# Patient Record
(demographics unavailable — no encounter records)

---

## 2025-01-07 NOTE — ASSESSMENT
[FreeTextEntry1] : 26 year old female with myofascial complaints PT meloxicam  We will also prescribe Muscle Relaxants as needed.  Discussed side effects including but not limited to drowsiness and dizziness.  Discussed patient should not drive after taking the medicine. MRI L spine

## 2025-01-07 NOTE — HISTORY OF PRESENT ILLNESS
[de-identified] : 1/7/25 26 year old female presents with complaints of low back pain that has started one year ago. 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 Patient was evaluated at Urgent care with recommendation to begin course of naproxen and flexeril prn. Denies radicular pain, n/t.  No PmHx, NKDA Occupation: Transport at [employer]  Date of Injury/Onset: 1 year  Pain: At Rest: 7-8/10 With Activity:  8-9/10 Mechanism of injury: NKI  Quality of symptoms: Stiffness, weakness, sharp pains occasionally  Improves with: back brace, Naproxen, heating pad  Worse with: Sleeping, bending, sitting, getting in the car  Prior treatment: [facility] Prior Imaging: None  Additional Information: None

## 2025-05-01 NOTE — HISTORY OF PRESENT ILLNESS
[FreeTextEntry1] : Ms. Celestin is a 26-year-old woman with a possible history of a learning disability referred for neuropsychological evaluation due to longstanding difficulties with attention and executive functioning.    She reported specific difficulties in the following areas: -	Attention: Difficulties with maintaining focus, distractibility, and returning to/completing tasks after getting distracted. -	Executive Functioning: Difficulties with initiation, maintaining organization, following through with plans and procrastination. -	Language: Challenges with expressing thoughts accurately and requiring repetition if she is inattentive. -	Memory: Difficulty with remembering new information and feels like it requires effort to recall past events.  Developmental History: Born via emergency . Milestones reported to have been met within the expected timeframe.  Medical History: Asthma, Polycystic Ovarian Syndrome  Psychiatric History / Mood: No formal diagnosis; She described feeling overwhelmed, stressed and under pressure her entire life.    Substance Use: Consumes alcohol rarely, socially  Medications: Birth control  Family History: Diabetes: Grandparents  Educational / Work History: Ms. Celestin had an IEP from  and throughout school, and was an overall C+ student. 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  Psychosocial History: Ms. Celestin is of [place] descent and lives in [place] with her father and step-mother. She stays with family members in [place] when she commutes for classes and work.

## 2025-05-01 NOTE — PHYSICAL EXAM
[Average] : average [Cooperative] : cooperative [Full] : full [Clear] : clear [Linear/Goal Directed] : linear/goal directed [WNL] : within normal limits [FreeTextEntry8] : Overwhelmed

## 2025-05-01 NOTE — REASON FOR VISIT
[Patient preference] : as per patient preference [Telehealth (audio & video) - Individual/Group] : This visit was provided via telehealth using real-time 2-way audio visual technology. [Medical Office: ([facility])___] : The provider was located at the medical office in [unfilled]. [Home] : The patient, [unfilled], was located at home, [unfilled], at the time of the visit. [Participant(s) identity verified] : Participant(s) identity verified. [For new patient(s) – practitioner identifies themselves to participants] : Practitioner identifies themselves to participants. [Verbal consent obtained from patient/other participant(s)] : Verbal consent for telehealth/telephonic services obtained from patient/other participant(s) [Consultation for neuropsychological evaluation] : Consultation for neuropsychological evaluation [Patient] : Patient

## 2025-05-01 NOTE — DISCUSSION/SUMMARY
[FreeTextEntry8] : Ms. Celestin presents for consultation with cognitive and behavioral, and academic concerns that are impacting her daily life and raise concern for the presence of ADHD.  .  [FreeTextEntry4] : .  It is recommended that she undergo a full neuropsychological evaluation to determine nature of the stated concerns and to aid in differential diagnosis and treatment planning.

## 2025-06-05 NOTE — REASON FOR VISIT
[Patient preference] : as per patient preference [Telehealth (audio & video) - Individual/Group] : This visit was provided via telehealth using real-time 2-way audio visual technology. [Medical Office: ([facility])___] : The provider was located at the medical office in [unfilled]. [Home] : The patient, [unfilled], was located at home, [unfilled], at the time of the visit. [Participant(s) identity verified] : Participant(s) identity verified. [Verbal consent obtained from patient/other participant(s)] : Verbal consent for telehealth/telephonic services obtained from patient/other participant(s) [Feedback of results of neuropsychological evaluation] : Feedback of results of neuropsychological evaluation [Patient] : Patient [FreeTextEntry1] : Neuropsychological Evaluation Report Personal Information	Evaluation Information Name: Antonette Celestin	Date of Interview: 2025 YOB: 1998	Date of Evaluation: 2028 Racial/Ethnic Identity: [place]American	Referring Provider: Self-Referred Primary Language: English 	Provider[facility] Peyman Hopkins, PhD, [facility] Education: 14 years	Examiner: Albertina Leong M.S.  The following information was obtained during an interview with Ms. Celestin, a review of medical records, and a psychometric evaluation (see appendix). ________________________________________  Referral & Background Ms. Celestin is a 26-year-old, right-handed woman referred for neuropsychological evaluation to characterize her cognitive functioning in the context of longstanding difficulties with attention and executive functioning.  Ms. Celestin first observed cognitive challenges in her childhood. She described experiencing difficulties with attention, specifically maintaining focus, distractibility, and returning to/completing tasks after getting distracted. She also shared executive functioning challenges, including difficulties with initiation, maintaining organization, following through with plans, and procrastination. Her attention and executive issues also impact her memory in that she experiences difficulty remembering new information and needing increased effort to recall past events.   With onset in childhood, these difficulties have impacted her schooling, work, and personal life.   Previous Evaluations: Ms. Celestin was evaluated during high school (2017) for language challenges. -	Clinical Evaluation of Language Fundamentals- 5 (CELF-5): o	Understanding Spoken Paragraphs: SS= 75 o	Semantic Relationships: SS= 80 o	Recalling Sentences: SS= 85  Developmental History:  -	Born vis emergency   Medical History:  -	Asthma -	Polycystic Ovarian Syndrome  Psychiatric History: -	No formal diagnosis; she described feeling overwhelmed and stressed and under pressure her entire life.   Substance Use History:  -	Rare, social alcohol consumption.  Current Medications:  -	Birth control (unspecified)  Family History:  -	Diabetes: Grandparents  Educational and Work History:  Ms. Celestin had an IEP from  and throughout school, and was an overall C+ student. 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  Psychosocial History: Ms. Celestin is of [place] descent and lives in [place] with her father and step-mother. She stays with family members in [place] when she commutes for classes and work.  ________________________________________  Presentation Appearance:  -	Presented on time to the session. -	Appeared her chronological age. Appropriately dressed and groomed. -	Eye contact was appropriate.  Sensory and Motor:  -	Ambulated independently and without any significant abnormality. -	Visual acuity and hearing appeared intact.   Speech and Language:  -	Receptive language was intact.  -	Expressive speech was normal for rate, volume, and tone.   Thought Processes:  -	Linear and goal directed. -	Initiated spontaneous conversation and remained on task with the conversation.  Mood and Affect:  -	Mood was euthymic and she displayed mood congruent affect.  Engagement:  -	She was appropriately engaged during the evaluation and appeared to put forth appropriate effort.  -	Overall, results are believed to be a valid estimate of Ms. Celestin's current cognitive functioning. ________________________________________  Results Baseline functioning was estimated to be average, and her current intellectual functioning was consistent with that estimate.   Sensorimotor: -	Intact sensory functions.  Attention:  -	Basic attention span and ability to sustain attention over time was grossly within normal limits. -	Weaknesses emerged in her ability to stay vigilant.  -	On self-report measures assessing attentional processing she endorsed mild difficulties with inattention and memory.  Processing Speed:  -	Processing speed was within average range.  Executive Functioning:  -	Average ability in initiation, set-shifting, inhibition, problem solving, conceptualization, and reasoning.  -	She exhibited significant challenges with planning, organization, and attention to detail.   Visuospatial Functioning:  -	Basic visual-spatial construction was average. -	She had some difficulty with visual scanning.  Language: -	Verbal production was average.  -	While naming of objects was below average for her age, she often accurately described the items, indicating a gap in word knowledge, rather than semantic knowledge.  Memory: -	A relative weakness. She exhibited memory difficulties in the breadth of her encoding of new information, meaning that she exhibited ability to learn information but at a more reduced level.  -	Her retention and recall of the information that she did encode, was fully intact.  Mood/Personality: -	She endorsed several symptoms of anxiety on a measure of self-reported anxiety symptoms, including feelings of nervousness, fear of the worst happening, and loss of control, as well as somatic complaints including trembling hands, shakiness, unsteadiness, inability to relax, dizziness or lightheadedness, heart pounding and indigestion or discomfort in abdomen.  -	Additionally, she endorsed several symptoms of depression on a measure of self-reported depressive symptoms, including feelings of self-dislike, agitation, crying, and indecisiveness. Additionally, she reported having less energy for activities, sleeping a lot less than usual, decreased appetite, fatigue and difficulty concentrating. She did not report symptoms of suicidal ideations. ________________________________________  Impressions Results of Ms. Celestin's evaluation are most significant weaknesses in some aspects of attention, executive functioning, and recall.   There are likely several factors that can be considered as contributory to Ms. Celestin's current level of cognitive functions. First, by her report, Ms. Celestin had early childhood cognitive and learning concerns that impacted her academic performance. As she noted, her grades were slightly below average throughout her primary schooling and she received academic supports through an P. While the details of her early cognitive weaknesses remain somewhat unclear, there is certainly some evidence that she experienced a neurodevelopmental process affecting her cognitive functioning. Her report was that her primary issues at that time in her life centered around her attentional processing and executive abilities, and our evaluation revealed current difficulties in those same areas, along with secondary impacts in other functions, such as memory. All taken together, her presentation appears to be consistent with Attention-Deficit / Hyperactivity Disorder (ADHD), primarily inattentive type.   These cognitive symptoms also occur in the context of moderate anxiety and mild depression, which appear to be exacerbating her underlying cognitive vulnerabilities. Anxiety often occurs secondary to ADHD due to the functional impact that cognitive inefficiencies can have on achievement. Anxiety itself specifically impacts cognitive functioning through several mechanisms. The excessive worry characteristic of anxiety disorders can consume cognitive resources, reducing capacity for attention and working memory; thus, creating a circular mechanism that further impacts cognitive efficiency. Neurobiological changes associated with anxiety, including heightened amygdala activity and altered prefrontal cortex functioning, can disrupt executive control networks. Furthermore, physiological symptoms of anxiety, such as increased muscle tension, and autonomic arousal, such as trembling hands, dizziness or feeling shaky, can create distractions that interfere with cognitive processing. From that standpoint, Ms. Celestin's presentation is also suggestive of Generalized Anxiety Disorder.   In summary, Ms. Celestin is a bright and capable woman who possesses good cognitive abilities, along with circumscribed weaknesses centered around complex attention and cognitive efficiency processes. With proper treatment and management of her cognitive and mood symptoms, it is likely that she will have continued success in her education and vocational pursuits.  ________________________________________  ICD-10 Diagnoses -	F90.9: ADHD -	F41.1 Generalized anxiety disorder ________________________________________  Recommendations 1.	Mood: Given symptoms endorsed, Ms. Celestin may benefit from treatment of mood symptom. She would likely benefit most from a present-focused, supportive approach, such as cognitive behavioral therapy (CBT), by providing a structured environment to explore and address underlying issues, develop coping strategies, and challenge negative thought patterns. Therapeutic approaches can help improve emotional regulation, increase engagement in daily activities, and enhance overall psychological well-being. -	The CBT practice at [facility] is recommended: [phone]  2.	Psychiatry Consultation: In addition to psychotherapy, Ms. Celestin may benefit from consulting with a psychiatrist to discuss medication options. Research has shown that a combination of psychotherapy and medication is often most effect in treating anxiety symptoms by regulating neurotransmitter levels. -	The [facility] Psychiatry practice in [place] is recommended: [phone]  3.	Academic Accommodations: Given her diagnosis of ADHD and anxiety disorder, Ms. Celestin is eligible for and recommended to receive academic accommodations. These should include, but not necessarily be limited to: -	Extended time on examinations -	Administration of exams in a quiet and distraction free location -	Extended time on assignments -	Preferential seating close to the area of instruction -	Availability to lecture notes prior to the class, when possible 4.	General Health: It is vital that Ms. Celestin continues to maintain a healthy lifestyle, including maintaining a balanced diet, regular sleep patterns, and moderate levels of physical exercise in order improve her mood symptoms, and maximize energy levels and focus. Research has consistently shown these behaviors can influence brain functioning by regulating neurotransmitter levels, including serotonin and dopamine, which are involved with mood regulation. Additionally, adequate sleep has been shown to support cognitive functioning.  5.	Follow-Up Evaluation: It is recommended that Ms. Celestin return for a follow-up neuropsychological evaluation if she experiences any changes in her functioning.

## 2025-08-14 NOTE — PLAN
[FreeTextEntry4] : On initial assessment with writer 7/24/2025:   On initial assessment,  Labs:    PLAN -Discussed the diagnosis, treatment, alternatives to recommended treatment, risk Vs benefits of treatment and no treatment and alternative treatments. -Lab/other tests: advised to continue to check annual bloodwork with PCP. -Medication: -Discussed recommendation for aerobic exercise -Discussed sleep hygiene -Educated patient of importance of remaining abstinent from drugs and alcohol. -Emergency procedures were discussed: pt. educated to call 911 or go to nearest ER for worsening of symptoms/suicidal/homicidal ideation. -Referred for individual psychotherapy to learn coping skills -Return to clinic in weeks or earlier as needed -Patient given opportunity to ask questions and their questions were answered and they expressed understanding and agreement with above plan.
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
